# Patient Record
Sex: FEMALE | Race: WHITE
[De-identification: names, ages, dates, MRNs, and addresses within clinical notes are randomized per-mention and may not be internally consistent; named-entity substitution may affect disease eponyms.]

---

## 2017-10-13 ENCOUNTER — HOSPITAL ENCOUNTER (EMERGENCY)
Dept: HOSPITAL 47 - EC | Age: 42
Discharge: HOME | End: 2017-10-13
Payer: COMMERCIAL

## 2017-10-13 VITALS — RESPIRATION RATE: 16 BRPM | TEMPERATURE: 97.9 F | HEART RATE: 75 BPM

## 2017-10-13 DIAGNOSIS — K02.9: ICD-10-CM

## 2017-10-13 DIAGNOSIS — F17.200: ICD-10-CM

## 2017-10-13 DIAGNOSIS — Z79.899: ICD-10-CM

## 2017-10-13 DIAGNOSIS — K04.7: Primary | ICD-10-CM

## 2017-10-13 PROCEDURE — 96372 THER/PROPH/DIAG INJ SC/IM: CPT

## 2017-10-13 PROCEDURE — 99283 EMERGENCY DEPT VISIT LOW MDM: CPT

## 2017-10-13 NOTE — ED
General Adult HPI





- General


Chief complaint: ENT


Stated complaint: facial swelling, congestion


Time Seen by Provider: 10/13/17 07:00


Source: patient, RN notes reviewed, old records reviewed


Mode of arrival: ambulatory


Limitations: no limitations





- History of Present Illness


Initial comments: 





This is a 42-year-old female to the ER for evaluation of dental disease.  Face 

pain, tooth pain.  Patient has jaw pain.  Severe.  No fevers.  No difficulty 

swallowing.  Does admit to increased and worsening and smelling breath.  

Patient has no modifying factors at home, not currently on antibiotics





- Related Data


 Home Medications











 Medication  Instructions  Recorded  Confirmed


 


Ibuprofen [Advil] 600 mg PO Q6HR PRN 06/02/15 12/28/16


 


ALPRAZolam [Xanax] 1 mg PO HS 16


 


HYDROcodone/APAP 10-325MG [Norco 1 tab PO DAILY PRN 16





]   








 Previous Rx's











 Medication  Instructions  Recorded


 


Ciprofloxacin HCl [Cipro] 500 mg PO Q12HR #14 tablet 16


 


Phenazopyridine [Pyridium] 100 mg PO TID #6 tablet 16


 


HYDROcodone/APAP 5-325MG [Norco 1 tab PO Q6HR PRN #30 tab 10/13/17





5-325]  


 


Penicillin V Potassium [Pen Vee K] 500 mg PO QID #40 tablet 10/13/17











 Allergies











Allergy/AdvReac Type Severity Reaction Status Date / Time


 


No Known Allergies Allergy   Verified 10/13/17 07:03














Review of Systems


ROS Statement: 


Those systems with pertinent positive or pertinent negative responses have been 

documented in the HPI.





ROS Other: All systems not noted in ROS Statement are negative.





Past Medical History


Past Medical History: Asthma, GERD/Reflux


Additional Past Medical History / Comment(s): anemia, murmur, asthma as child,  

migraines


History of Any Multi-Drug Resistant Organisms: None Reported


Past Surgical History: Appendectomy,  Section, Tubal Ligation


Past Anesthesia/Blood Transfusion Reactions: No Reported Reaction


Additional Past Anesthesia/Blood Transfusion Reaction / Comment(s): pt stated 

aa seemed to wear off to early during sx they had to give her more


Past Psychological History: No Psychological Hx Reported


Smoking Status: Current every day smoker


Past Alcohol Use History: None Reported


Past Drug Use History: None Reported





- Past Family History


  ** Father


Family Medical History: Unable to Obtain





  ** Mother


Family Medical History: Cancer


Additional Family Medical History / Comment(s):  at age 52 of ovarian cancer





General Exam


Limitations: no limitations


General appearance: alert, in no apparent distress


Head exam: Present: atraumatic, normocephalic, normal inspection


Eye exam: Present: normal appearance, PERRL, EOMI.  Absent: scleral icterus, 

conjunctival injection, periorbital swelling


ENT exam: Present: normal exam, mucous membranes moist


Neck exam: Present: normal inspection.  Absent: tenderness, meningismus, 

lymphadenopathy


Respiratory exam: Present: normal lung sounds bilaterally.  Absent: respiratory 

distress, wheezes, rales, rhonchi, stridor


Cardiovascular Exam: Present: regular rate, normal rhythm, normal heart sounds.

  Absent: systolic murmur, diastolic murmur, rubs, gallop, clicks


GI/Abdominal exam: Present: soft, normal bowel sounds.  Absent: distended, 

tenderness, guarding, rebound, rigid


Extremities exam: Present: normal inspection, full ROM, normal capillary 

refill.  Absent: tenderness, pedal edema, joint swelling, calf tenderness


Back exam: Present: normal inspection


Neurological exam: Present: alert, oriented X3, CN II-XII intact


Psychiatric exam: Present: normal affect, normal mood


Skin exam: Present: warm, dry, intact, normal color.  Absent: rash





Course


 Vital Signs











  10/13/17





  06:57


 


Temperature 97.9 F


 


Pulse Rate 75


 


Respiratory 16





Rate 


 


O2 Sat by Pulse 100





Oximetry 














Medical Decision Making





- Medical Decision Making





42 female in the ER for evaluation of dental disease.  Positive dental abscess 

and exam adrenal abscess found.  Patient can be discharged home with 

antibiotics and pain control





Disposition


Clinical Impression: 


 Dental abscess, Dental caries





Disposition: HOME SELF-CARE


Condition: Good


Instructions:  Dental Abscess (ED)


Prescriptions: 


HYDROcodone/APAP 5-325MG [Norco 5-325] 1 tab PO Q6HR PRN #30 tab


 PRN Reason: Pain


Penicillin V Potassium [Pen Vee K] 500 mg PO QID #40 tablet


Referrals: 


Alonso Kasper DO [Primary Care Provider] - 1-2 days

## 2017-10-16 NOTE — CDI
Documentation Clarification OP

Dear Murphy [place], ,



Please add addendum for HPI, Physical examination and MDM.



Thank you,

Joshua.

.



If you have any questions please contact  at [phone].

[facility]

## 2021-12-09 ENCOUNTER — HOSPITAL ENCOUNTER (OUTPATIENT)
Dept: HOSPITAL 47 - LABWHC1 | Age: 46
Discharge: HOME | End: 2021-12-09
Attending: NURSE PRACTITIONER
Payer: COMMERCIAL

## 2021-12-09 DIAGNOSIS — M25.50: Primary | ICD-10-CM

## 2021-12-09 PROCEDURE — 83516 IMMUNOASSAY NONANTIBODY: CPT

## 2021-12-09 PROCEDURE — 85652 RBC SED RATE AUTOMATED: CPT

## 2021-12-09 PROCEDURE — 86225 DNA ANTIBODY NATIVE: CPT

## 2021-12-09 PROCEDURE — 86140 C-REACTIVE PROTEIN: CPT

## 2021-12-09 PROCEDURE — 86431 RHEUMATOID FACTOR QUANT: CPT

## 2021-12-09 PROCEDURE — 86235 NUCLEAR ANTIGEN ANTIBODY: CPT

## 2021-12-09 PROCEDURE — 36415 COLL VENOUS BLD VENIPUNCTURE: CPT

## 2021-12-09 PROCEDURE — 86200 CCP ANTIBODY: CPT

## 2021-12-09 PROCEDURE — 86038 ANTINUCLEAR ANTIBODIES: CPT

## 2021-12-10 LAB
CCP IGG SERPL-ACNC: <0.5 U/ML
DSDNA AB SER QL: NEGATIVE
DSDNA AB TITR SER: <1 IU/ML
ENA JO1 AB SER IA-ACNC: <0.2 AI
ENA SCL70 AB SER IA-ACNC: <0.2 AI
ENA SM AB SER IA-ACNC: NEGATIVE
RHEUMATOID FACT SERPL-ACNC: <10 IU/ML (ref 0–15)